# Patient Record
Sex: FEMALE | Race: WHITE | NOT HISPANIC OR LATINO | Employment: OTHER | URBAN - METROPOLITAN AREA
[De-identification: names, ages, dates, MRNs, and addresses within clinical notes are randomized per-mention and may not be internally consistent; named-entity substitution may affect disease eponyms.]

---

## 2017-01-30 ENCOUNTER — TELEPHONE (OUTPATIENT)
Dept: OBSTETRICS AND GYNECOLOGY | Age: 72
End: 2017-01-30

## 2017-01-30 ENCOUNTER — OFFICE VISIT (OUTPATIENT)
Dept: OBSTETRICS AND GYNECOLOGY | Age: 72
End: 2017-01-30

## 2017-01-30 VITALS
BODY MASS INDEX: 22.69 KG/M2 | DIASTOLIC BLOOD PRESSURE: 80 MMHG | HEIGHT: 62 IN | SYSTOLIC BLOOD PRESSURE: 138 MMHG | WEIGHT: 123.3 LBS

## 2017-01-30 DIAGNOSIS — Z11.51 SPECIAL SCREENING EXAMINATION FOR HUMAN PAPILLOMAVIRUS (HPV): ICD-10-CM

## 2017-01-30 DIAGNOSIS — Z00.00 MEDICARE ANNUAL WELLNESS VISIT, SUBSEQUENT: ICD-10-CM

## 2017-01-30 DIAGNOSIS — Z85.3 HX: BREAST CANCER: ICD-10-CM

## 2017-01-30 DIAGNOSIS — Z12.4 ROUTINE CERVICAL SMEAR: Primary | ICD-10-CM

## 2017-01-30 PROCEDURE — G0101 CA SCREEN;PELVIC/BREAST EXAM: HCPCS | Performed by: OBSTETRICS & GYNECOLOGY

## 2017-01-30 RX ORDER — OXYCODONE HYDROCHLORIDE AND ACETAMINOPHEN 5; 325 MG/1; MG/1
TABLET ORAL
Refills: 0 | COMMUNITY
Start: 2017-01-16 | End: 2018-02-05

## 2017-02-03 LAB
CYTOLOGIST CVX/VAG CYTO: NORMAL
CYTOLOGY CVX/VAG DOC THIN PREP: NORMAL
DX ICD CODE: NORMAL
HIV 1 & 2 AB SER-IMP: NORMAL
HPV I/H RISK 4 DNA CVX QL PROBE+SIG AMP: NEGATIVE
OTHER STN SPEC: NORMAL
PATH REPORT.FINAL DX SPEC: NORMAL
STAT OF ADQ CVX/VAG CYTO-IMP: NORMAL

## 2018-02-05 ENCOUNTER — OFFICE VISIT (OUTPATIENT)
Dept: OBSTETRICS AND GYNECOLOGY | Age: 73
End: 2018-02-05

## 2018-02-05 VITALS
SYSTOLIC BLOOD PRESSURE: 140 MMHG | WEIGHT: 115 LBS | HEIGHT: 62 IN | BODY MASS INDEX: 21.16 KG/M2 | DIASTOLIC BLOOD PRESSURE: 78 MMHG

## 2018-02-05 DIAGNOSIS — M81.8 OTHER OSTEOPOROSIS WITHOUT CURRENT PATHOLOGICAL FRACTURE: Primary | ICD-10-CM

## 2018-02-05 DIAGNOSIS — Z00.00 ANNUAL PHYSICAL EXAM: ICD-10-CM

## 2018-02-05 PROCEDURE — 99397 PER PM REEVAL EST PAT 65+ YR: CPT | Performed by: OBSTETRICS & GYNECOLOGY

## 2018-02-05 NOTE — PROGRESS NOTES
Subjective   Daniel Canales is a 72 y.o. female is being seen today for   Chief Complaint   Patient presents with   • Gynecologic Exam     DANIEL IS HERE FOR HER ANNUAL EXAM.  LAST PAP SMEAR WAS IN 2017 AND WAS NEGATIVE;.  HX OF BREAST CANCER.    .    History of Present Illness  Patient is here for annual exam.  She is doing well during this past year.  She is doing well after her breast cancer.  She is really had no other problems in the meantime she did have cataract surgery performed.  Her bowels and bladder are working well her family is doing well 2 boys and 1 granddaughter who just went off to college.  The patient blood pressure is up a little bit and she is aware of that and we'll watch it.  She is very much into the natural things less medication the better is ovaries looking for ways to try to help herself without going on medicine.  We talked about the osteoporosis some today.  No other complaints  The following portions of the patient's history were reviewed and updated as appropriate: allergies, current medications, past family history, past medical history, past social history, past surgical history and problem list.    Vitals:    18 1317   BP: 140/78       PAST MEDICAL HISTORY  Past Medical History:   Diagnosis Date   • Basal cell carcinoma     nose    • Breast cancer     Mastectomy    • Cataract      OB History      Para Term  AB Living    2 2 2       SAB TAB Ectopic Multiple Live Births                Past Surgical History:   Procedure Laterality Date   • BASAL CELL CARCINOMA EXCISION     • BREAST BIOPSY     • BREAST LUMPECTOMY     • CATARACT EXTRACTION Bilateral     Dr. Tigist BUSH   • COLONOSCOPY      Dr. Mcarthur   • MASTECTOMY Right      Family History   Problem Relation Age of Onset   • Heart disease Mother    • Cancer Father      lung    • Cancer Sister      ovarian    • Heart disease Sister    • No Known Problems Son    • Diabetes Son      History   Smoking Status   • Never Smoker    Smokeless Tobacco   • Never Used       Current Outpatient Prescriptions:   •  calcium carbonate-cholecalciferol 500-400 MG-UNIT tablet tablet, Take 1 tablet by mouth 3 (Three) Times a Day., Disp: , Rfl:   •  Melatonin 3 MG tablet dispersible, Take  by mouth., Disp: , Rfl:     There is no immunization history on file for this patient.    Review of Systems   Constitutional: Negative for chills, fatigue, fever and unexpected weight change.   Respiratory: Negative for shortness of breath and wheezing.    Cardiovascular: Negative for chest pain.   Gastrointestinal: Negative for abdominal distention, abdominal pain, blood in stool, constipation, diarrhea and nausea.   Genitourinary: Negative for difficulty urinating, dyspareunia, dysuria, frequency, hematuria, menstrual problem, pelvic pain, urgency and vaginal discharge.   Skin: Negative for rash.       Objective   Physical Exam   Constitutional: She is oriented to person, place, and time. Vital signs are normal. She appears well-developed and well-nourished.   Neck: No thyromegaly present.   Cardiovascular: Normal rate, regular rhythm and normal heart sounds.    Pulmonary/Chest: Effort normal. Right breast exhibits no inverted nipple, no mass, no nipple discharge, no skin change and no tenderness. Left breast exhibits no inverted nipple, no mass, no nipple discharge, no skin change and no tenderness. Breasts are symmetrical. There is no breast swelling.   Abdominal: Soft.   Genitourinary: Vagina normal and uterus normal. No breast tenderness, discharge or bleeding. Pelvic exam was performed with patient supine. No labial fusion. There is no rash, tenderness, lesion or injury on the right labia. There is no rash, tenderness, lesion or injury on the left labia. Cervix exhibits no motion tenderness, no discharge and no friability. Right adnexum displays no mass, no tenderness and no fullness. Left adnexum displays no mass, no tenderness and no fullness.   Neurological:  She is alert and oriented to person, place, and time.   Psychiatric: She has a normal mood and affect.   Vitals reviewed.        Assessment/Plan   Natalia was seen today for gynecologic exam.    Diagnoses and all orders for this visit:    Other osteoporosis without current pathological fracture    Annual physical exam      Exam was normal today.  She is due for mammogram is aware of that.  Pap smear is not done today was done last year.  Colonoscopy is up-to-date just on a 5 year plan and a bone density still up-to-date with osteoporosis.  Diet and exercise were discussed come back in year

## 2021-04-27 ENCOUNTER — TRANSCRIBE ORDERS (OUTPATIENT)
Dept: ADMINISTRATIVE | Facility: HOSPITAL | Age: 76
End: 2021-04-27

## 2021-04-27 DIAGNOSIS — M81.0 OSTEOPOROSIS, UNSPECIFIED OSTEOPOROSIS TYPE, UNSPECIFIED PATHOLOGICAL FRACTURE PRESENCE: Primary | ICD-10-CM

## 2021-07-26 ENCOUNTER — TRANSCRIBE ORDERS (OUTPATIENT)
Dept: ADMINISTRATIVE | Facility: HOSPITAL | Age: 76
End: 2021-07-26

## 2021-07-26 DIAGNOSIS — Z12.31 VISIT FOR SCREENING MAMMOGRAM: Primary | ICD-10-CM

## 2021-08-24 ENCOUNTER — APPOINTMENT (OUTPATIENT)
Dept: BONE DENSITY | Facility: HOSPITAL | Age: 76
End: 2021-08-24

## 2022-08-05 ENCOUNTER — TRANSCRIBE ORDERS (OUTPATIENT)
Dept: ADMINISTRATIVE | Facility: HOSPITAL | Age: 77
End: 2022-08-05

## 2022-08-05 DIAGNOSIS — Z12.31 VISIT FOR SCREENING MAMMOGRAM: Primary | ICD-10-CM

## 2022-10-17 ENCOUNTER — HOSPITAL ENCOUNTER (OUTPATIENT)
Dept: MAMMOGRAPHY | Facility: HOSPITAL | Age: 77
Discharge: HOME | End: 2022-10-17
Admitting: INTERNAL MEDICINE

## 2022-10-17 DIAGNOSIS — Z12.31 VISIT FOR SCREENING MAMMOGRAM: ICD-10-CM

## 2022-10-17 PROCEDURE — 77063 BREAST TOMOSYNTHESIS BI: CPT

## 2022-10-17 PROCEDURE — 77067 SCR MAMMO BI INCL CAD: CPT

## 2023-12-12 ENCOUNTER — TRANSCRIBE ORDERS (OUTPATIENT)
Dept: ADMINISTRATIVE | Facility: HOSPITAL | Age: 78
End: 2023-12-12
Payer: MEDICARE

## 2023-12-12 DIAGNOSIS — Z12.31 SCREENING MAMMOGRAM FOR BREAST CANCER: Primary | ICD-10-CM

## 2023-12-19 ENCOUNTER — HOSPITAL ENCOUNTER (OUTPATIENT)
Dept: MAMMOGRAPHY | Facility: HOSPITAL | Age: 78
Discharge: HOME | End: 2023-12-19
Admitting: INTERNAL MEDICINE
Payer: MEDICARE

## 2023-12-19 DIAGNOSIS — Z12.31 SCREENING MAMMOGRAM FOR BREAST CANCER: ICD-10-CM

## 2023-12-19 PROCEDURE — 77067 SCR MAMMO BI INCL CAD: CPT

## 2023-12-19 PROCEDURE — 77063 BREAST TOMOSYNTHESIS BI: CPT
